# Patient Record
(demographics unavailable — no encounter records)

---

## 2024-10-22 NOTE — HISTORY OF PRESENT ILLNESS
[FreeTextEntry1] : The patient is a 34-year-old female who presents with left lower extremity swelling around the ankle and calf region.  She also complains of some superficial varicosities.  The patient states she has a history of DVT however was never treated with anticoagulation.  She was treated with aspirin regiment.  The patient is unsure of what vein the clot was in whether it was superficial or deep.  Today she presents complaining of left leg swelling which has been present for the past few months and is overall improving.  She also complains of some superficial varicose veins and discoloration at the ankle.

## 2024-10-22 NOTE — DATA REVIEWED
[FreeTextEntry1] : Venous duplex left there is no evidence of acute deep venous thrombosis or superficial thrombophlebitis.  All major veins are patent and compressible with normal spectral waveform analysis.  The reflux exam was performed in the standing position.  The greater saphenous vein is incompetent.  There is a straight segment from the groin to the knee.  The diameter at the junction is 1.3 cm and refill time of 7.3 seconds at the diameter of the knee is 7.8 mm with refill time of 7.0 seconds.  There are large incompetent branches that come off the greater saphenous vein at the level of the proximal thigh and largest diameter measuring 1.7 cm

## 2024-10-22 NOTE — PHYSICAL EXAM
[2+] : left 2+ [Ankle Swelling (On Exam)] : present [Ankle Swelling On The Left] : moderate [Varicose Veins Of Lower Extremities] : bilaterally [] : of the left leg [Ankle Swelling On The Right] : mild

## 2024-10-22 NOTE — ASSESSMENT
[FreeTextEntry1] : The patient is a 34-year-old female with symptomatic right lower extremity varicose veins.  The patient will benefit from right leg saphenous vein ablation given her incompetent saphenous vein and her tributary veins.  The patient states her leg swelling is causing her discomfort and interfering with her activities of daily living.  The patient has worn compression stocking therapy for the past 3 months and despite that is still complaining of symptoms.  I have discussed the risks of varicose vein ablation. Patient understands the risks of recurrence, bleeding, infection, thrombosis, nerve injury, wound complications, injury to the catheterized vessel and need for additional procedures. I will schedule the patient for a left leg greater saphenous vein ablation  I, Dr. Bo Anthony, personally performed the evaluation and management (E/M) services for this new patient. That E/M includes conducting the clinically appropriate initial history &/or exam, assessing all conditions, and establishing the plan of care. Today, my CYNTHAI, Dana Raphael PA-C, was here to observe my evaluation and management service for this patient & follow plan of care established by me going forward.

## 2025-06-10 NOTE — DATA REVIEWED
[FreeTextEntry1] : CT and MRI of right wrist images reviewed and results discussed at length with the patient.

## 2025-06-10 NOTE — HISTORY OF PRESENT ILLNESS
[FreeTextEntry1] : 34F w/ hx of R distal radius GCT s/p curettage/cementation (2005) with subsequent multiple local recurrences s/p repeat surgery (2006, 2007, 2008, 2013, 2021, [facility]), recently found to have another local recurrence (4/2025). 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 Ashton and is now being refrred for consultation to discuss right wrist GCT ablation.   Denies any recent SOB, CP, fever, chills, n/v/d. ??/

## 2025-06-18 NOTE — HISTORY OF PRESENT ILLNESS
[FreeTextEntry1] : 34F w/ hx of R distal radius GCT s/p curettage/cementation (2005) with subsequent multiple local recurrences s/p repeat surgery (2006, 2007, 2008, 2013, 2021, [facility]), recently found to have another local recurrence (4/2025). 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 Ashton and is now being referred for consultation to discuss right wrist GCT ablation.   Denies any recent SOB, CP, fever, chills, n/v/d. Denies any pain in the right wrist.     Accompanied by tamika Sena and Grandfather Luis Daniel Owusu.  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

## 2025-06-18 NOTE — CONSULT LETTER
[Dear  ___] : Dear  [unfilled], [Consult Letter:] : I had the pleasure of evaluating your patient, [unfilled]. [Please see my note below.] : Please see my note below. [Consult Closing:] : Thank you very much for allowing me to participate in the care of this patient.  If you have any questions, please do not hesitate to contact me. [Sincerely,] : Sincerely, [FreeTextEntry2] : Dr. Ashton

## 2025-06-18 NOTE — ASSESSMENT
[FreeTextEntry1] : 34 year old female with h/o Right Distal Radius GCT, s/p fracture, s/p curettage/cementation (2005) with subsequent multiple local recurrences s/p repeat surgery (2006, 2007, 2008, 2013, 2021, [facility]), recently found to have another local recurrence (4/2025). Patient referred for possible GCT recurrence ablation to prevent progression.  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 RFA/MWA can be used but not a cryoablation due to cold allergy.  Procedure, it's risks, benefits and alternatives were discussed with the patient and her parents and informed consent obtained.  Plan: 1. PST 2. Sedation by anesthesia 3. Supine position. 4. Initial CT with the palm of the hand down.

## 2025-06-18 NOTE — PHYSICAL EXAM
[Alert] : alert [No Respiratory Distress] : no respiratory distress [Normal Rate] : heart rate was normal  [Oriented x3] : oriented to person, place, and time [de-identified] : right wrist incision noted no pain in the right wrist on physical exam.

## 2025-06-18 NOTE — PHYSICAL EXAM
[Alert] : alert [No Respiratory Distress] : no respiratory distress [Normal Rate] : heart rate was normal  [Oriented x3] : oriented to person, place, and time [de-identified] : right wrist incision noted no pain in the right wrist on physical exam.